# Patient Record
Sex: FEMALE | URBAN - METROPOLITAN AREA
[De-identification: names, ages, dates, MRNs, and addresses within clinical notes are randomized per-mention and may not be internally consistent; named-entity substitution may affect disease eponyms.]

---

## 2020-06-25 ENCOUNTER — APPOINTMENT (OUTPATIENT)
Age: 35
Setting detail: DERMATOLOGY
End: 2020-06-26

## 2020-06-25 DIAGNOSIS — L20.89 OTHER ATOPIC DERMATITIS: ICD-10-CM

## 2020-06-25 DIAGNOSIS — R20.8 OTHER DISTURBANCES OF SKIN SENSATION: ICD-10-CM

## 2020-06-25 DIAGNOSIS — L85.3 XEROSIS CUTIS: ICD-10-CM

## 2020-06-25 PROBLEM — L20.84 INTRINSIC (ALLERGIC) ECZEMA: Status: ACTIVE | Noted: 2020-06-25

## 2020-06-25 PROCEDURE — 96372 THER/PROPH/DIAG INJ SC/IM: CPT

## 2020-06-25 PROCEDURE — 99203 OFFICE O/P NEW LOW 30 MIN: CPT | Mod: 25

## 2020-06-25 PROCEDURE — OTHER INTRAMUSCULAR KENALOG: OTHER

## 2020-06-25 PROCEDURE — OTHER COUNSELING: OTHER

## 2020-06-25 ASSESSMENT — LOCATION SIMPLE DESCRIPTION DERM
LOCATION SIMPLE: LEFT BUTTOCK
LOCATION SIMPLE: UPPER BACK
LOCATION SIMPLE: CHEST

## 2020-06-25 ASSESSMENT — LOCATION ZONE DERM: LOCATION ZONE: TRUNK

## 2020-06-25 ASSESSMENT — LOCATION DETAILED DESCRIPTION DERM
LOCATION DETAILED: INFERIOR THORACIC SPINE
LOCATION DETAILED: LEFT BUTTOCK
LOCATION DETAILED: MIDDLE STERNUM

## 2020-06-25 NOTE — PROCEDURE: COUNSELING
Detail Level: Generalized
Patient Specific Counseling (Will Not Stick From Patient To Patient): Discussed phototherapy but this is not an option due to physical limitations (unable to come to clinic multiple times per week for multiple weeks). Discussed options, and patient opted for IMK today with hope to start Dupixent soon. Paperwork for Dupixent submitted, pending approval, will plan to start Dupixent.
Patient Specific Counseling (Will Not Stick From Patient To Patient): See above.

## 2020-06-25 NOTE — HPI: RASH (ECZEMA)
How Severe Is Your Eczema?: severe
Is This A New Presentation, Or A Follow-Up?: Rash
Additional History: Patient is miserable, and desperate for relief.

## 2020-06-25 NOTE — PROCEDURE: INTRAMUSCULAR KENALOG
Consent: The risks of atrophy, weakened immune system, headache, dizziness, insomnia, elevated blood pressure and impaired glucose were reviewed with the patient. Patient verbalized understanding of risk factors and opted to move forward with injection.
Detail Level: Simple
Concentration (Mg/Ml): 40.0
Total Volume (Ccs): 1.5
Add Option For Additional Mediation: No
Administered By (Optional): Kyler LATIF
Kenalog Preparation: kenalog
Concentration (Mg/Ml) Of Additional Medication: 2.5

## 2020-07-23 ENCOUNTER — RX ONLY (RX ONLY)
Age: 35
End: 2020-07-23

## 2020-07-23 RX ORDER — DUPILUMAB 300 MG/2ML
INJECTION, SOLUTION SUBCUTANEOUS
Qty: 3 | Refills: 0 | Status: ERX | COMMUNITY
Start: 2020-07-23

## 2020-07-30 ENCOUNTER — RX ONLY (RX ONLY)
Age: 35
End: 2020-07-30

## 2020-07-30 RX ORDER — DUPILUMAB 300 MG/2ML
INJECTION, SOLUTION SUBCUTANEOUS
Qty: 3 | Refills: 0 | Status: ERX

## 2020-11-17 ENCOUNTER — RX ONLY (RX ONLY)
Age: 35
End: 2020-11-17

## 2020-11-17 RX ORDER — DUPILUMAB 300 MG/2ML
INJECTION, SOLUTION SUBCUTANEOUS
Qty: 3 | Refills: 3 | Status: CANCELLED

## 2020-11-18 ENCOUNTER — RX ONLY (RX ONLY)
Age: 35
End: 2020-11-18

## 2020-11-18 RX ORDER — DUPILUMAB 300 MG/2ML
INJECTION, SOLUTION SUBCUTANEOUS
Qty: 3 | Refills: 3 | Status: ERX

## 2020-12-24 ENCOUNTER — RX ONLY (RX ONLY)
Age: 35
End: 2020-12-24

## 2020-12-24 RX ORDER — DUPILUMAB 300 MG/2ML
INJECTION, SOLUTION SUBCUTANEOUS
Qty: 2 | Refills: 5 | Status: CANCELLED

## 2020-12-28 ENCOUNTER — RX ONLY (RX ONLY)
Age: 35
End: 2020-12-28

## 2020-12-28 RX ORDER — DUPILUMAB 300 MG/2ML
INJECTION, SOLUTION SUBCUTANEOUS
Qty: 2 | Refills: 5 | Status: ERX

## 2022-07-21 ENCOUNTER — LAB REQUISITION (OUTPATIENT)
Dept: LAB | Facility: CLINIC | Age: 37
End: 2022-07-21

## 2022-07-21 DIAGNOSIS — N93.0 POSTCOITAL AND CONTACT BLEEDING: ICD-10-CM

## 2022-07-21 DIAGNOSIS — Z01.419 ENCOUNTER FOR GYNECOLOGICAL EXAMINATION (GENERAL) (ROUTINE) WITHOUT ABNORMAL FINDINGS: ICD-10-CM

## 2022-07-21 PROCEDURE — 87591 N.GONORRHOEAE DNA AMP PROB: CPT | Performed by: NURSE PRACTITIONER

## 2022-07-21 PROCEDURE — 87624 HPV HI-RISK TYP POOLED RSLT: CPT | Performed by: NURSE PRACTITIONER

## 2022-07-21 PROCEDURE — 87491 CHLMYD TRACH DNA AMP PROBE: CPT | Performed by: NURSE PRACTITIONER

## 2022-07-21 PROCEDURE — G0145 SCR C/V CYTO,THINLAYER,RESCR: HCPCS | Performed by: NURSE PRACTITIONER

## 2022-07-22 LAB
C TRACH DNA SPEC QL PROBE+SIG AMP: NEGATIVE
N GONORRHOEA DNA SPEC QL NAA+PROBE: NEGATIVE

## 2022-07-26 LAB
BKR LAB AP GYN ADEQUACY: NORMAL
BKR LAB AP GYN INTERPRETATION: NORMAL
BKR LAB AP HPV REFLEX: NORMAL
BKR LAB AP LMP: NORMAL
BKR LAB AP PREVIOUS ABNORMAL: NORMAL
PATH REPORT.COMMENTS IMP SPEC: NORMAL
PATH REPORT.COMMENTS IMP SPEC: NORMAL
PATH REPORT.RELEVANT HX SPEC: NORMAL

## 2022-07-27 LAB
HUMAN PAPILLOMA VIRUS 16 DNA: NEGATIVE
HUMAN PAPILLOMA VIRUS 18 DNA: NEGATIVE
HUMAN PAPILLOMA VIRUS FINAL DIAGNOSIS: NORMAL
HUMAN PAPILLOMA VIRUS OTHER HR: NEGATIVE

## 2022-09-02 ENCOUNTER — LAB REQUISITION (OUTPATIENT)
Dept: LAB | Facility: CLINIC | Age: 37
End: 2022-09-02

## 2022-09-02 DIAGNOSIS — N97.9 FEMALE INFERTILITY, UNSPECIFIED: ICD-10-CM

## 2022-09-02 LAB — PROGEST SERPL-MCNC: 4.2 NG/ML

## 2022-09-02 PROCEDURE — 84144 ASSAY OF PROGESTERONE: CPT | Performed by: NURSE PRACTITIONER

## 2022-10-19 ENCOUNTER — LAB REQUISITION (OUTPATIENT)
Dept: LAB | Facility: CLINIC | Age: 37
End: 2022-10-19

## 2022-10-19 DIAGNOSIS — N97.8 FEMALE INFERTILITY OF OTHER ORIGIN: ICD-10-CM

## 2022-10-19 LAB
ESTRADIOL SERPL-MCNC: 24 PG/ML
MIS SERPL-MCNC: 1.32 NG/ML (ref 0.15–7.5)

## 2022-10-19 PROCEDURE — 83520 IMMUNOASSAY QUANT NOS NONAB: CPT | Performed by: NURSE PRACTITIONER

## 2022-10-19 PROCEDURE — 82670 ASSAY OF TOTAL ESTRADIOL: CPT | Performed by: NURSE PRACTITIONER

## 2023-02-27 ENCOUNTER — ANCILLARY PROCEDURE (OUTPATIENT)
Dept: GENERAL RADIOLOGY | Facility: CLINIC | Age: 38
End: 2023-02-27
Attending: OBSTETRICS & GYNECOLOGY
Payer: COMMERCIAL

## 2023-02-27 DIAGNOSIS — N97.9 INFERTILITY, FEMALE: ICD-10-CM

## 2023-02-27 PROCEDURE — 255N000002 HC RX 255 OP 636: Performed by: OBSTETRICS & GYNECOLOGY

## 2023-02-27 PROCEDURE — 74740 X-RAY FEMALE GENITAL TRACT: CPT

## 2023-02-27 RX ORDER — IOPAMIDOL 612 MG/ML
100 INJECTION, SOLUTION INTRAVASCULAR ONCE
Status: COMPLETED | OUTPATIENT
Start: 2023-02-27 | End: 2023-02-27

## 2023-02-27 RX ADMIN — IOPAMIDOL 7 ML: 612 INJECTION, SOLUTION INTRAVENOUS at 10:44

## 2023-02-28 NOTE — OP NOTE
Procedure Date: 2023    LOCATION:  [facility] at [facility].    ATTENDING PHYSICIAN:  Yodit Razo M.D.    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    The patient is currently on day 10 of her menstrual cycle and has been taking letrozole, which she completed today for ovarian stimulation.    Consent was signed and we proceeded to the radiology suite.    DESCRIPTION OF PROCEDURE:  The patient was placed into a dorsal lithotomy position.  Speculum was placed.  Cervix was cleansed with Betadine.  Tenaculum was placed on the anterior lip.  A sonohysterographic catheter was used to enter the internal os.  A small balloon was insufflated with approximately 2 mL of air.  At this time, the radiologist was called and under direct visualization 7 mL of radiopaque dye was injected through the sonohysterogram catheter into the uterine cavity.    Findings included a normal-appearing uterine cavity and bilateral spill through both fallopian tubes consistent with bilateral tubal patency.    Following completion of the procedure, the small balloon was deflated.  The sonohysterographic catheter was removed and noted to be intact.  There was a small amount of bleeding from tenaculum site that was controlled using pressure.    The patient was advised that she may have mild cramping, but to call with any significant pain.  She will be seen in our office next week for a follicle study.    Yodit Razo MD        D: 2023   T: 2023   MT: ANN-MARIE    Name:     JULIANA MERAZIjeoma  MRN:      [id number]        Account:        [id number]   :      1985           Procedure Date: 2023     Document: [id number]

## 2023-07-20 ENCOUNTER — OFFICE VISIT (OUTPATIENT)
Dept: URGENT CARE | Facility: URGENT CARE | Age: 38
End: 2023-07-20
Payer: COMMERCIAL

## 2023-07-20 VITALS
TEMPERATURE: 97.6 F | HEART RATE: 107 BPM | RESPIRATION RATE: 18 BRPM | SYSTOLIC BLOOD PRESSURE: 148 MMHG | DIASTOLIC BLOOD PRESSURE: 105 MMHG | OXYGEN SATURATION: 95 % | WEIGHT: 225 LBS

## 2023-07-20 DIAGNOSIS — R05.1 ACUTE COUGH: ICD-10-CM

## 2023-07-20 DIAGNOSIS — J01.90 ACUTE SINUSITIS WITH SYMPTOMS > 10 DAYS: Primary | ICD-10-CM

## 2023-07-20 PROCEDURE — 99203 OFFICE O/P NEW LOW 30 MIN: CPT | Performed by: STUDENT IN AN ORGANIZED HEALTH CARE EDUCATION/TRAINING PROGRAM

## 2023-07-20 RX ORDER — ALBUTEROL SULFATE 90 UG/1
2 AEROSOL, METERED RESPIRATORY (INHALATION) EVERY 6 HOURS PRN
Qty: 18 G | Refills: 0 | Status: SHIPPED | OUTPATIENT
Start: 2023-07-20

## 2023-07-20 RX ORDER — BUPROPION HYDROCHLORIDE 300 MG/1
TABLET ORAL
COMMUNITY
Start: 2022-11-19

## 2023-07-20 RX ORDER — ESCITALOPRAM OXALATE 20 MG/1
TABLET ORAL
COMMUNITY
Start: 2023-02-27

## 2023-07-20 RX ORDER — ESCITALOPRAM OXALATE 20 MG/1
1 TABLET ORAL EVERY MORNING
COMMUNITY
Start: 2022-11-26

## 2023-07-20 RX ORDER — IPRATROPIUM BROMIDE 21 UG/1
2 SPRAY, METERED NASAL
COMMUNITY
Start: 2023-05-03

## 2023-07-20 NOTE — PROGRESS NOTES
ASSESSMENT & PLAN:   Diagnoses and all orders for this visit:  Acute sinusitis with symptoms > 10 days  -     amoxicillin-clavulanate (AUGMENTIN) 875-125 MG tablet; Take 1 tablet by mouth 2 times daily for 7 days  Acute cough  -     albuterol (PROAIR HFA/PROVENTIL HFA/VENTOLIN HFA) 108 (90 Base) MCG/ACT inhaler; Inhale 2 puffs into the lungs every 6 hours as needed for shortness of breath, wheezing or cough    URI symptoms x10 days. With duration of symptoms will treat with Augmentin x7 days. Recently finished prednisone burst, which slightly improved her congestion. On exam, clear lung sounds without wheezing. Patient reported wheezing. Rx for albuterol inhaler as needed for wheezing/shortness of breath. Follow-up if continued shortness of breath, chest pain, or difficulty breathing.    No follow-ups on file.    There are no Patient Instructions on file for this visit.    At the end of the encounter, I discussed results, diagnosis, medications. Discussed red flags for immediate return to clinic/ER, as well as indications for follow up if no improvement. Patient and/or caregiver understood and agreed to plan. Patient was stable for discharge.    ------------------------------------------------------------------------  SUBJECTIVE  Patient presents with:  Urgent Care  Sick: Sick x2 weeks, steroid meds taking, but not getting better and congestion now travel down to chest, SOB.     HPI  Connie Phelan is a(n) 38 year old female presenting to urgent care for URI symptoms x10 days. She had a virtual visit 6 days ago and was started on prednisone, which has improved her congestion. Currently has congestion, postnasal drip, cough, shortness of breath, wheezing. No history of asthma.    Review of Systems    Current Outpatient Medications   Medication Sig Dispense Refill     albuterol (PROAIR HFA/PROVENTIL HFA/VENTOLIN HFA) 108 (90 Base) MCG/ACT inhaler Inhale 2 puffs into the lungs every 6 hours as needed for shortness of  breath, wheezing or cough 18 g 0     amoxicillin-clavulanate (AUGMENTIN) 875-125 MG tablet Take 1 tablet by mouth 2 times daily for 7 days 14 tablet 0     buPROPion (WELLBUTRIN XL) 300 MG 24 hr tablet TAKE 1 TABLET BY MOUTH EVERY MORNING ORAL 90       escitalopram (LEXAPRO) 20 MG tablet Take 1 tablet by mouth every morning       escitalopram (LEXAPRO) 20 MG tablet        ipratropium (ATROVENT) 0.03 % nasal spray 2 sprays       Problem List:  There are no relevant problems documented for this patient.    No Known Allergies      OBJECTIVE  Vitals:    07/20/23 1544   BP: (!) 148/105   Pulse: 107   Resp: 18   Temp: 97.6  F (36.4  C)   TempSrc: Temporal   SpO2: 95%   Weight: 102.1 kg (225 lb)     Physical Exam   GENERAL: healthy, alert, no acute distress.   HEAD: normocephalic, atraumatic.  EYE: PERRL. EOMs intact. No scleral injection bilaterally.   EAR: external ear normal. Bilateral ear canals normal and nonpainful. Bilateral TM intact, pearly, translucent without bulging.  NOSE: external nose atraumatic without lesions.  OROPHARYNX: moist mucous membranes. Posterior oropharynx slightly erythematous. No exudate. Uvula midline. Patent airway.  LUNGS: no increased work of breathing. Clear lung sounds bilaterally. No wheezing, rhonchi, or rales.   CV: regular rate and rhythm. No clicks, murmurs, or rubs.    No results found for any visits on 07/20/23.

## 2023-10-22 ENCOUNTER — HEALTH MAINTENANCE LETTER (OUTPATIENT)
Age: 38
End: 2023-10-22

## 2024-07-28 ENCOUNTER — HEALTH MAINTENANCE LETTER (OUTPATIENT)
Age: 39
End: 2024-07-28

## 2024-07-29 ENCOUNTER — LAB REQUISITION (OUTPATIENT)
Dept: LAB | Facility: CLINIC | Age: 39
End: 2024-07-29

## 2024-07-29 DIAGNOSIS — N92.1 EXCESSIVE AND FREQUENT MENSTRUATION WITH IRREGULAR CYCLE: ICD-10-CM

## 2024-07-29 PROCEDURE — 84443 ASSAY THYROID STIM HORMONE: CPT | Performed by: NURSE PRACTITIONER

## 2024-07-30 LAB — TSH SERPL DL<=0.005 MIU/L-ACNC: 2.88 UIU/ML (ref 0.3–4.2)

## 2024-09-13 ENCOUNTER — LAB REQUISITION (OUTPATIENT)
Dept: LAB | Facility: CLINIC | Age: 39
End: 2024-09-13

## 2024-09-13 DIAGNOSIS — N84.0 POLYP OF CORPUS UTERI: ICD-10-CM

## 2024-09-13 PROCEDURE — 88305 TISSUE EXAM BY PATHOLOGIST: CPT | Performed by: PATHOLOGY

## 2024-09-17 LAB
PATH REPORT.COMMENTS IMP SPEC: NORMAL
PATH REPORT.COMMENTS IMP SPEC: NORMAL
PATH REPORT.FINAL DX SPEC: NORMAL
PATH REPORT.GROSS SPEC: NORMAL
PATH REPORT.MICROSCOPIC SPEC OTHER STN: NORMAL
PATH REPORT.RELEVANT HX SPEC: NORMAL
PHOTO IMAGE: NORMAL

## 2024-12-15 ENCOUNTER — HEALTH MAINTENANCE LETTER (OUTPATIENT)
Age: 39
End: 2024-12-15

## 2025-06-12 ENCOUNTER — LAB REQUISITION (OUTPATIENT)
Dept: LAB | Facility: CLINIC | Age: 40
End: 2025-06-12

## 2025-06-12 DIAGNOSIS — N97.9 FEMALE INFERTILITY, UNSPECIFIED: ICD-10-CM

## 2025-06-12 LAB — TSH SERPL DL<=0.005 MIU/L-ACNC: 1.77 UIU/ML (ref 0.3–4.2)

## 2025-06-12 PROCEDURE — 84443 ASSAY THYROID STIM HORMONE: CPT | Performed by: NURSE PRACTITIONER

## 2025-08-12 ENCOUNTER — LAB REQUISITION (OUTPATIENT)
Dept: LAB | Facility: CLINIC | Age: 40
End: 2025-08-12

## 2025-08-12 ENCOUNTER — LAB REQUISITION (OUTPATIENT)
Dept: LAB | Facility: CLINIC | Age: 40
End: 2025-08-12
Payer: COMMERCIAL

## 2025-08-12 DIAGNOSIS — N97.9 FEMALE INFERTILITY, UNSPECIFIED: ICD-10-CM

## 2025-08-12 PROCEDURE — 83001 ASSAY OF GONADOTROPIN (FSH): CPT | Performed by: NURSE PRACTITIONER

## 2025-08-12 PROCEDURE — 83002 ASSAY OF GONADOTROPIN (LH): CPT | Performed by: NURSE PRACTITIONER

## 2025-08-12 PROCEDURE — 82670 ASSAY OF TOTAL ESTRADIOL: CPT | Performed by: NURSE PRACTITIONER

## 2025-08-13 LAB
ESTRADIOL SERPL-MCNC: 67 PG/ML
FSH SERPL IRP2-ACNC: 9.1 MIU/ML
LH SERPL-ACNC: 10 MIU/ML
MIS SERPL-MCNC: 1.52 NG/ML (ref 0.03–5.5)